# Patient Record
Sex: FEMALE | Race: WHITE | Employment: FULL TIME | URBAN - METROPOLITAN AREA
[De-identification: names, ages, dates, MRNs, and addresses within clinical notes are randomized per-mention and may not be internally consistent; named-entity substitution may affect disease eponyms.]

---

## 2023-03-07 RX ORDER — PRAVASTATIN SODIUM 20 MG
20 TABLET ORAL NIGHTLY
COMMUNITY

## 2023-03-07 RX ORDER — CALCIUM CARBONATE 500(1250)
500 TABLET ORAL NIGHTLY
COMMUNITY

## 2023-03-07 RX ORDER — METOPROLOL TARTRATE 100 MG/1
100 TABLET ORAL 2 TIMES DAILY
COMMUNITY

## 2023-03-07 RX ORDER — LOSARTAN POTASSIUM 50 MG/1
50 TABLET ORAL NIGHTLY
COMMUNITY

## 2023-03-07 RX ORDER — LEVOTHYROXINE SODIUM 0.1 MG/1
100 TABLET ORAL DAILY
COMMUNITY

## 2023-03-07 NOTE — PROGRESS NOTES
Patient verified name and     Order for consent WAS NOT found in EHR and matches case posting; patient verified. Type 1A surgery, PHONE assessment complete. Labs per surgeon: PENDING ORDERS  Labs per anesthesia protocol: NONE  EKG: IN CC AS NEEDED-- [address] Pérez approved surgical skin cleanser and instructions given per hospital policy. Patient provided with and instructed on educational handouts including Guide to Surgery, Pain Management, Hand Hygiene, Blood Transfusion Education, and [facility] Brochure. Patient answered medical/surgical history questions at their best of ability. All prior to admission medications documented in [facility]. Original medication prescription bottle WAS NOT visualized during patient appointment. Patient instructed to hold all vitamins 7 days prior to surgery and NSAIDS 5 days prior to surgery, patient verbalized understanding. Patient teach back successful and patient demonstrates knowledge of instructions.

## 2023-03-07 NOTE — PROGRESS NOTES
PLEASE CONTINUE TAKING ALL PRESCRIPTION MEDICATIONS UP TO THE DAY OF SURGERY UNLESS OTHERWISE DIRECTED BELOW. DISCONTINUE all vitamins and supplements 7 days prior to surgery. DISCONTINUE Non-Steriodal Anti-Inflammatory (NSAIDS) such as Advil and Aleve 5 days prior to surgery. Home Medications to take  the day of surgery    Synthroid, metoprolol           Home Medications   to Hold   All vitamins and supplements        Comments      On the day before surgery please take Acetaminophen 1000mg in the morning and then again before bed. You may substitute for Tylenol 650 mg. Please do not bring home medications with you on the day of surgery unless otherwise directed by your nurse. If you are instructed to bring home medications, please give them to your nurse as they will be administered by the nursing staff. If you have any questions, [facility] [phone] or [facility] [phone]. A copy of this note was provided to the patient for reference.

## 2023-03-16 ENCOUNTER — ANESTHESIA EVENT (OUTPATIENT)
Dept: SURGERY | Age: 68
End: 2023-03-16
Payer: MEDICARE

## 2023-03-16 NOTE — PERIOP NOTE
Preop department called to notify patient of arrival time for scheduled procedure. Instructions given to   - Arrive at [address] - Remain NPO after midnight, unless otherwise indicated, including gum, mints, and ice chips. - Have a responsible adult to drive patient to the hospital, stay during surgery, and patient will need supervision 24 hours after anesthesia. - Use antibacterial soap in shower the night before surgery and on the morning of surgery.        Was patient contacted: no  Voicemail left: yes  Numbers contacted: [phone]   Arrival time: 3128    Pt mobile had static when answered , left detailed voicemail on [phone]

## 2023-03-17 ENCOUNTER — ANESTHESIA (OUTPATIENT)
Dept: SURGERY | Age: 68
End: 2023-03-17
Payer: MEDICARE

## 2023-03-17 ENCOUNTER — HOSPITAL ENCOUNTER (OUTPATIENT)
Age: 68
Setting detail: OUTPATIENT SURGERY
Discharge: HOME | End: 2023-03-17
Attending: PODIATRIST | Admitting: PODIATRIST
Payer: MEDICARE

## 2023-03-17 ENCOUNTER — APPOINTMENT (OUTPATIENT)
Dept: GENERAL RADIOLOGY | Age: 68
End: 2023-03-17
Attending: PODIATRIST
Payer: MEDICARE

## 2023-03-17 VITALS
RESPIRATION RATE: 16 BRPM | BODY MASS INDEX: 25.71 KG/M2 | TEMPERATURE: 97 F | HEART RATE: 59 BPM | OXYGEN SATURATION: 98 % | DIASTOLIC BLOOD PRESSURE: 59 MMHG | WEIGHT: 160 LBS | HEIGHT: 66 IN | SYSTOLIC BLOOD PRESSURE: 130 MMHG

## 2023-03-17 DIAGNOSIS — Z98.890 S/P FOOT SURGERY: Primary | ICD-10-CM

## 2023-03-17 LAB
GLUCOSE BLD STRIP.AUTO-MCNC: 90 MG/DL (ref 65–100)
SERVICE CMNT-IMP: NORMAL

## 2023-03-17 PROCEDURE — 7100000000 HC PACU RECOVERY - FIRST 15 MIN: Performed by: PODIATRIST

## 2023-03-17 PROCEDURE — 2709999900 HC NON-CHARGEABLE SUPPLY: Performed by: PODIATRIST

## 2023-03-17 PROCEDURE — 7100000011 HC PHASE II RECOVERY - ADDTL 15 MIN: Performed by: PODIATRIST

## 2023-03-17 PROCEDURE — 7100000001 HC PACU RECOVERY - ADDTL 15 MIN: Performed by: PODIATRIST

## 2023-03-17 PROCEDURE — 2500000003 HC RX 250 WO HCPCS: Performed by: NURSE ANESTHETIST, CERTIFIED REGISTERED

## 2023-03-17 PROCEDURE — 3600000012 HC SURGERY LEVEL 2 ADDTL 15MIN: Performed by: PODIATRIST

## 2023-03-17 PROCEDURE — 6360000002 HC RX W HCPCS: Performed by: NURSE ANESTHETIST, CERTIFIED REGISTERED

## 2023-03-17 PROCEDURE — 3600000002 HC SURGERY LEVEL 2 BASE: Performed by: PODIATRIST

## 2023-03-17 PROCEDURE — 7100000010 HC PHASE II RECOVERY - FIRST 15 MIN: Performed by: PODIATRIST

## 2023-03-17 PROCEDURE — 6360000002 HC RX W HCPCS: Performed by: PODIATRIST

## 2023-03-17 PROCEDURE — 3700000001 HC ADD 15 MINUTES (ANESTHESIA): Performed by: PODIATRIST

## 2023-03-17 PROCEDURE — 82962 GLUCOSE BLOOD TEST: CPT

## 2023-03-17 PROCEDURE — 3700000000 HC ANESTHESIA ATTENDED CARE: Performed by: PODIATRIST

## 2023-03-17 PROCEDURE — 2500000003 HC RX 250 WO HCPCS: Performed by: PODIATRIST

## 2023-03-17 PROCEDURE — 2580000003 HC RX 258: Performed by: ANESTHESIOLOGY

## 2023-03-17 PROCEDURE — 6360000002 HC RX W HCPCS: Performed by: ANESTHESIOLOGY

## 2023-03-17 RX ORDER — HYDRALAZINE HYDROCHLORIDE 20 MG/ML
10 INJECTION INTRAMUSCULAR; INTRAVENOUS
Status: DISCONTINUED | OUTPATIENT
Start: 2023-03-17 | End: 2023-03-17 | Stop reason: HOSPADM

## 2023-03-17 RX ORDER — BUPIVACAINE HYDROCHLORIDE 5 MG/ML
INJECTION, SOLUTION EPIDURAL; INTRACAUDAL PRN
Status: DISCONTINUED | OUTPATIENT
Start: 2023-03-17 | End: 2023-03-17 | Stop reason: HOSPADM

## 2023-03-17 RX ORDER — SODIUM CHLORIDE, SODIUM LACTATE, POTASSIUM CHLORIDE, CALCIUM CHLORIDE 600; 310; 30; 20 MG/100ML; MG/100ML; MG/100ML; MG/100ML
INJECTION, SOLUTION INTRAVENOUS CONTINUOUS
Status: DISCONTINUED | OUTPATIENT
Start: 2023-03-17 | End: 2023-03-17 | Stop reason: HOSPADM

## 2023-03-17 RX ORDER — LIDOCAINE HYDROCHLORIDE 10 MG/ML
INJECTION, SOLUTION INFILTRATION; PERINEURAL PRN
Status: DISCONTINUED | OUTPATIENT
Start: 2023-03-17 | End: 2023-03-17 | Stop reason: HOSPADM

## 2023-03-17 RX ORDER — HYDROXYZINE PAMOATE 25 MG/1
25 CAPSULE ORAL EVERY 6 HOURS PRN
Qty: 20 CAPSULE | Refills: 0 | Status: SHIPPED | OUTPATIENT
Start: 2023-03-17 | End: 2023-03-31

## 2023-03-17 RX ORDER — OXYCODONE HYDROCHLORIDE 5 MG/1
5 TABLET ORAL
Status: DISCONTINUED | OUTPATIENT
Start: 2023-03-17 | End: 2023-03-17 | Stop reason: HOSPADM

## 2023-03-17 RX ORDER — CEPHALEXIN 500 MG/1
500 CAPSULE ORAL 2 TIMES DAILY
Qty: 14 CAPSULE | Refills: 0 | Status: SHIPPED | OUTPATIENT
Start: 2023-03-17 | End: 2023-03-24

## 2023-03-17 RX ORDER — HYDROMORPHONE HYDROCHLORIDE 2 MG/ML
0.25 INJECTION, SOLUTION INTRAMUSCULAR; INTRAVENOUS; SUBCUTANEOUS EVERY 5 MIN PRN
Status: DISCONTINUED | OUTPATIENT
Start: 2023-03-17 | End: 2023-03-17 | Stop reason: HOSPADM

## 2023-03-17 RX ORDER — LABETALOL HYDROCHLORIDE 5 MG/ML
10 INJECTION, SOLUTION INTRAVENOUS
Status: DISCONTINUED | OUTPATIENT
Start: 2023-03-17 | End: 2023-03-17 | Stop reason: HOSPADM

## 2023-03-17 RX ORDER — FENTANYL CITRATE 50 UG/ML
100 INJECTION, SOLUTION INTRAMUSCULAR; INTRAVENOUS
Status: DISCONTINUED | OUTPATIENT
Start: 2023-03-17 | End: 2023-03-17 | Stop reason: HOSPADM

## 2023-03-17 RX ORDER — SODIUM CHLORIDE 0.9 % (FLUSH) 0.9 %
5-40 SYRINGE (ML) INJECTION EVERY 12 HOURS SCHEDULED
Status: DISCONTINUED | OUTPATIENT
Start: 2023-03-17 | End: 2023-03-17 | Stop reason: HOSPADM

## 2023-03-17 RX ORDER — SODIUM CHLORIDE 9 MG/ML
INJECTION, SOLUTION INTRAVENOUS PRN
Status: DISCONTINUED | OUTPATIENT
Start: 2023-03-17 | End: 2023-03-17 | Stop reason: HOSPADM

## 2023-03-17 RX ORDER — EPHEDRINE SULFATE/0.9% NACL/PF 50 MG/5 ML
SYRINGE (ML) INTRAVENOUS PRN
Status: DISCONTINUED | OUTPATIENT
Start: 2023-03-17 | End: 2023-03-17 | Stop reason: SDUPTHER

## 2023-03-17 RX ORDER — PROPOFOL 10 MG/ML
INJECTION, EMULSION INTRAVENOUS PRN
Status: DISCONTINUED | OUTPATIENT
Start: 2023-03-17 | End: 2023-03-17 | Stop reason: SDUPTHER

## 2023-03-17 RX ORDER — ONDANSETRON 2 MG/ML
4 INJECTION INTRAMUSCULAR; INTRAVENOUS
Status: DISCONTINUED | OUTPATIENT
Start: 2023-03-17 | End: 2023-03-17 | Stop reason: HOSPADM

## 2023-03-17 RX ORDER — SODIUM CHLORIDE 0.9 % (FLUSH) 0.9 %
5-40 SYRINGE (ML) INJECTION PRN
Status: DISCONTINUED | OUTPATIENT
Start: 2023-03-17 | End: 2023-03-17 | Stop reason: HOSPADM

## 2023-03-17 RX ORDER — LIDOCAINE HYDROCHLORIDE 20 MG/ML
INJECTION, SOLUTION EPIDURAL; INFILTRATION; INTRACAUDAL; PERINEURAL PRN
Status: DISCONTINUED | OUTPATIENT
Start: 2023-03-17 | End: 2023-03-17 | Stop reason: SDUPTHER

## 2023-03-17 RX ORDER — HYDROMORPHONE HYDROCHLORIDE 2 MG/ML
0.5 INJECTION, SOLUTION INTRAMUSCULAR; INTRAVENOUS; SUBCUTANEOUS EVERY 5 MIN PRN
Status: DISCONTINUED | OUTPATIENT
Start: 2023-03-17 | End: 2023-03-17 | Stop reason: HOSPADM

## 2023-03-17 RX ORDER — LIDOCAINE HYDROCHLORIDE 10 MG/ML
1 INJECTION, SOLUTION INFILTRATION; PERINEURAL
Status: DISCONTINUED | OUTPATIENT
Start: 2023-03-17 | End: 2023-03-17 | Stop reason: HOSPADM

## 2023-03-17 RX ORDER — HYDROCODONE BITARTRATE AND ACETAMINOPHEN 7.5; 325 MG/1; MG/1
1 TABLET ORAL EVERY 6 HOURS PRN
Qty: 20 TABLET | Refills: 0 | Status: SHIPPED | OUTPATIENT
Start: 2023-03-17 | End: 2023-03-20

## 2023-03-17 RX ORDER — PROCHLORPERAZINE EDISYLATE 5 MG/ML
5 INJECTION INTRAMUSCULAR; INTRAVENOUS
Status: DISCONTINUED | OUTPATIENT
Start: 2023-03-17 | End: 2023-03-17 | Stop reason: HOSPADM

## 2023-03-17 RX ORDER — MIDAZOLAM HYDROCHLORIDE 2 MG/2ML
2 INJECTION, SOLUTION INTRAMUSCULAR; INTRAVENOUS
Status: DISCONTINUED | OUTPATIENT
Start: 2023-03-17 | End: 2023-03-17 | Stop reason: HOSPADM

## 2023-03-17 RX ADMIN — PROPOFOL 30 MG: 10 INJECTION, EMULSION INTRAVENOUS at 12:58

## 2023-03-17 RX ADMIN — PROPOFOL 20 MG: 10 INJECTION, EMULSION INTRAVENOUS at 13:26

## 2023-03-17 RX ADMIN — PROPOFOL 50 MG: 10 INJECTION, EMULSION INTRAVENOUS at 12:52

## 2023-03-17 RX ADMIN — SODIUM CHLORIDE, POTASSIUM CHLORIDE, SODIUM LACTATE AND CALCIUM CHLORIDE: 600; 310; 30; 20 INJECTION, SOLUTION INTRAVENOUS at 10:55

## 2023-03-17 RX ADMIN — Medication 15 MG: at 13:09

## 2023-03-17 RX ADMIN — HYDROMORPHONE HYDROCHLORIDE 0.5 MG: 2 INJECTION, SOLUTION INTRAMUSCULAR; INTRAVENOUS; SUBCUTANEOUS at 13:45

## 2023-03-17 RX ADMIN — Medication 2000 MG: at 12:42

## 2023-03-17 RX ADMIN — PROPOFOL 140 MCG/KG/MIN: 10 INJECTION, EMULSION INTRAVENOUS at 13:03

## 2023-03-17 RX ADMIN — LIDOCAINE HYDROCHLORIDE 30 MG: 20 INJECTION, SOLUTION EPIDURAL; INFILTRATION; INTRACAUDAL; PERINEURAL at 12:52

## 2023-03-17 RX ADMIN — PROPOFOL 20 MG: 10 INJECTION, EMULSION INTRAVENOUS at 13:06

## 2023-03-17 RX ADMIN — PROPOFOL 50 MG: 10 INJECTION, EMULSION INTRAVENOUS at 12:56

## 2023-03-17 NOTE — ANESTHESIA POSTPROCEDURE EVALUATION
Department of Anesthesiology  Postprocedure Note    Patient: Aide Reece  MRN: [id number]  YOB: 1955  Date of evaluation: 3/17/2023      Procedure Summary     Date: 03/17/23 Room / Location: [facility] OP OR 01 / [facility]    Anesthesia Start: 9432 Anesthesia Stop: 4450    Procedure: TOTAL RIGHT 3RD TOE AMPUTATION (Right: Third Toe) Diagnosis:       Ulcer of foot due to secondary diabetes mellitus ([address]      Acute osteomyelitis-ankle/foot, right ([address]      (Ulcer of foot due to secondary diabetes mellitus ([address] [G42.417, L97.509])      (Acute osteomyelitis-ankle/foot, right ([address] [A72.929])    Surgeons: Dulce Maria Ramsay MD Responsible Provider: Mika Bae DO    Anesthesia Type: TIVA ASA Status: 3          Anesthesia Type: No value filed.     Nagi Phase I: Nagi Score: 8    Nagi Phase II: Nagi Score: 10      Anesthesia Post Evaluation    Patient location during evaluation: PACU  Level of consciousness: awake and alert  Airway patency: patent  Nausea & Vomiting: no nausea  Complications: no  Cardiovascular status: hemodynamically stable  Respiratory status: acceptable  Hydration status: euvolemic

## 2023-03-17 NOTE — OP NOTE
[facility]  OPERATIVE REPORT    Name:  Tennille Vasques  MR#:  [id number]  :  1955  ACCOUNT #:  [de-identified]  DATE OF SERVICE:  2023    PREOPERATIVE DIAGNOSES:  1. Diabetic with foot ulcer. 2.  Osteomyelitis, right third toe. POSTOPERATIVE DIAGNOSES:  1. Diabetic with foot ulcer. 2.  Osteomyelitis, right third toe. PROCEDURE PERFORMED:  Right third toe amputation. SURGEON:  Dang Blair MD     ASSISTANT:  None. ANESTHESIA:  MAC with 6 mL of 1% lidocaine plain. COMPLICATIONS:  None. SPECIMENS REMOVED:  No specimen sent to Pathology. IMPLANTS:  None. ESTIMATED BLOOD LOSS:  Less than 5 mL. HEMOSTASIS:  Pneumatic ankle tourniquet at 250 mmHg on the right for 25 minutes. MATERIALS:  3-0 Vicryl, 3-0 nylon. INJECTABLES:  10 mL of 0.5% Marcaine plain. INDICATION FOR SURGERY:  The patient is a pleasant 66-year-old diabetic with chronic right third toe ulcer that probed bone. Patient's ulcer would not heel and the patient was complaining of significant pain. The wound did not respond to conservative care. Risks and benefits of amputation of the digit were reviewed in detail with the patient. All questions were answered. The patient presented to the operating room at [facility] for the procedure as an outpatient. PROCEDURE:  The patient brought to the operating room, placed on the operating table in supine position. After MAC anesthesia was obtained, the foot was scrubbed, prepped and draped in usual aseptic manner. 6 mL of 1% lidocaine plain were injected about the base of the right third toe. The foot was elevated for exsanguination. Pneumatic ankle tourniquet was inflated. Next, a linear longitudinal incision was made dorsal to the second metatarsophalangeal joint. A racket type incision was made around the right, is distal to the base of the proximal phalanx.   The incision was deepened down to bone with care taken to identify and retract all vital neurovascular structures. The distal toe was disarticulated at the proximal interphalangeal joint and the proximal phalanx was resected and disarticulated at the metatarsophalangeal joint. The wound was then flushed with copious amounts of sterile normal saline. The subcutaneous tissues were reapproximated and coapted utilizing 3-0 Vicryl and the skin was reapproximated and coapted utilizing 3-0 nylon in a simple interrupted horizontal suture technique. 10 mL of 0.5% Marcaine plain were injected about the incision sites. Xeroform, dry sterile dressing followed by an Ace wrap were applied. The patient tolerated procedure and anesthesia well. He was transferred from the operating room to PACU with vital signs stable and neurovascular status intact to the toes of the right foot. He will be discharged with both written and oral postoperative instructions.       Ceasar Mota MD      [id number]  D:  03/17/2023 13:56  T:  03/17/2023 15:40  JOB #:  [id number]

## 2023-03-17 NOTE — DISCHARGE INSTRUCTIONS
ACTIVITY  As tolerated and as directed by your doctor. Bathe or shower as directed by your doctor. DIET  Clear liquids until no nausea or vomiting; then light diet for the first day. Advance to regular diet on second day, unless your doctor orders otherwise. If nausea and vomiting continues, call your doctor. After general anesthesia or intravenous sedation, for 24 hours or while taking prescription Narcotics:  Limit your activities  A responsible adult needs to be with you for the next 24 hours  Do not drive and operate hazardous machinery  Do not make important personal or business decisions  Do not drink alcoholic beverages  If you have not urinated within 8 hours after discharge, and you are experiencing discomfort from urinary retention, please go to the nearest ED. If you have sleep apnea and have a CPAP machine, please use it for all naps and sleeping. Please use caution when taking narcotics and any of your home medications that may cause drowsiness. *  Please give a list of your current medications to your Primary Care Provider. *  Please update this list whenever your medications are discontinued, doses are      changed, or new medications (including over-the-counter products) are added. *  Please carry medication information at all times in case of emergency situations.

## 2023-03-17 NOTE — H&P
Department of Anesthesiology  History and Physical                                        Name:  Pily Calzada           Age:  79 y.o.  :  1955                                               MRN:  [id number]                                                                      Date:  3/17/2023              Surgeon: Zak Benavidez):  Ria Malave MD     Procedure: Procedure(s):  PARTIAL RIGHT 3RD TOE AMPUTATION VS TOTAL RIGHT 3RD TOE AMPUTATION     Medications prior to admission:   Home Medications           Prior to Admission medications    Medication Sig Start Date End Date Taking?  Authorizing Provider   levothyroxine (SYNTHROID) 100 MCG tablet Take 100 mcg by mouth Daily     Yes Historical Provider, MD   metoprolol (LOPRESSOR) 100 MG tablet Take 100 mg by mouth 2 times daily     Yes Historical Provider, MD   pravastatin (PRAVACHOL) 20 MG tablet Take 20 mg by mouth at bedtime     Yes Historical Provider, MD   losartan (COZAAR) 50 MG tablet Take 50 mg by mouth at bedtime     Yes Historical Provider, MD   Coenzyme Q10 (COQ-10) 100 MG CAPS Take 1 capsule by mouth daily     Yes Historical Provider, MD   calcium carbonate (OSCAL) 500 MG TABS tablet Take 500 mg by mouth at bedtime     Yes Historical Provider, MD   Glucosamine-Chondroit-Vit C-Mn (GLUCOSAMINE 1500 COMPLEX PO) Take 1 tablet by mouth daily     Yes Historical Provider, MD            Current medications:    Current Facility-Administered Medications             Current Facility-Administered Medications   Medication Dose Route Frequency Provider Last Rate Last Admin    lidocaine 1 % injection 1 mL  1 mL IntraDERmal Once PRN [facility] Noel Weir DO        fentaNYL (SUBLIMAZE) injection 100 mcg  100 mcg IntraVENous Once PRN [facility] aCstaneda, DO        lactated ringers IV soln infusion   IntraVENous Continuous [facility] Castaneda,  mL/hr at 23 1055 New Bag at 23 1055    sodium chloride flush 0.9 % injection 5-40 mL  5-40 mL IntraVENous 2 times per day Sue Michaels, DO        sodium chloride flush 0.9 % injection 5-40 mL  5-40 mL IntraVENous PRN Sue Michaels, DO        0.9 % sodium chloride infusion   IntraVENous PRN Sue Michaels, DO        midazolam PF (VERSED) injection 2 mg  2 mg IntraVENous Once PRN Sue Castaneda, DO        ceFAZolin (ANCEF) 2000 mg in sterile water 20 mL IV syringe  2,000 mg IntraVENous Once Andree Urbina MD                Allergies: Allergies   Allergen Reactions    Codeine Headaches         Problem List:  There is no problem list on file for this patient. Past Medical History:    Past Medical History             Diagnosis Date    Adverse effect of anesthesia       pt states with locals she doesnt ever get numb    Diet-controlled diabetes mellitus ([address]       sqbs am avg  115--125-- no hypo    [place] (hard of hearing)       wears hearing aids in both ears    Hypercholesterolemia      Hypertension       controlled with med    LBBB (left bundle branch block)       followed by dr Ventura Hernandez cardio    Rectal cancer [facility]) 2013     surg only    Thyroid disease       hypo            Past Surgical History:    Past Surgical History             Procedure Laterality Date    BREAST LUMPECTOMY Right       1986 and 1992-- benign    [place] Bilateral 2007     CATARACTS    HYSTERECTOMY (CERVIX STATUS UNKNOWN)   [address] Right 2020     [employer]      LAP. --- [facility]    RECTAL SURGERY   2013     CANCER REMOVED-- THEN REPAIR SAME YR    SINUS SURGERY   1984            Social History:    Social History           Tobacco Use    Smoking status: Never    Smokeless tobacco: Never   Substance Use Topics    Alcohol use: Never                                 Counseling given: Not Answered        Vital Signs (Current):   Vitals        Vitals:     03/07/23 1400 03/17/23 1035   BP:   (!) 154/69 Pulse:   51   Resp:   18   Temp:   98 °F (36.7 °C)   TempSrc:   Oral   SpO2:   99%   Weight: 160 lb (72.6 kg) 160 lb (72.6 kg)   Height: 5' 6\" (1.676 m)                                                       BP Readings from Last 3 Encounters:   03/17/23 (!) 154/69         NPO Status: Time of last liquid consumption: 2359                        Time of last solid consumption: 2359                        Date of last liquid consumption: 03/16/23                        Date of last solid food consumption: 03/16/23     BMI:       Wt Readings from Last 3 Encounters:   03/17/23 160 lb (72.6 kg)     Body mass index is 25.82 kg/m².      CBC: No results found for: WBC, RBC, HGB, HCT, MCV, RDW, PLT     CMP: No results found for: NA, K, CL, CO2, BUN, CREATININE, GFRAA, AGRATIO, LABGLOM, GLUCOSE, GLU, PROT, CALCIUM, BILITOT, ALKPHOS, AST, ALT     POC Tests:       Recent Labs     03/17/23  1057   POCGLU 90         Coags: No results found for: PROTIME, INR, APTT     HCG (If Applicable): No results found for: PREGTESTUR, PREGSERUM, HCG, HCGQUANT      ABGs: No results found for: PHART, PO2ART, APB6YZX, AZV1YFF, BEART, W1CJUHIA      Type & Screen (If Applicable):  No results found for: LABABO, LABRH     Drug/Infectious Status (If Applicable):  No results found for: HIV, HEPCAB     COVID-19 Screening (If Applicable): No results found for: COVID19           Anesthesia Evaluation  Patient summary reviewed  Airway: Mallampati: II             Dental: normal exam          Pulmonary: breath sounds clear to auscultation  (+) asthma:                                         Cardiovascular:  Exercise tolerance: good (>4 METS),   (+) hypertension:, hyperlipidemia           Rhythm: regular  Rate: normal                      ROS comment: Chronic LBBB      Neuro/Psych:   Negative Neuro/Psych ROS                GI/Hepatic/Renal:                Endo/Other:    (+) DiabetesType II DM, , hypothyroidism::., .                         Abdominal: Vascular: negative vascular ROS. Other Findings:             Anesthesia Plan        TIVA      ASA 3         Induction: intravenous. Anesthetic plan and risks discussed with patient and child/children.

## 2023-03-17 NOTE — ANESTHESIA PRE PROCEDURE
Department of Anesthesiology  Preprocedure Note       Name:  Michel Mckeon   Age:  79 y.o.  :  1955                                          MRN:  [id number]         Date:  3/17/2023      Surgeon: Michelle Knowles):  Danay Tom MD    Procedure: Procedure(s):  PARTIAL RIGHT 3RD TOE AMPUTATION VS TOTAL RIGHT 3RD TOE AMPUTATION    Medications prior to admission:   Prior to Admission medications    Medication Sig Start Date End Date Taking?  Authorizing Provider   levothyroxine (SYNTHROID) 100 MCG tablet Take 100 mcg by mouth Daily   Yes Historical Provider, MD   metoprolol (LOPRESSOR) 100 MG tablet Take 100 mg by mouth 2 times daily   Yes Historical Provider, MD   pravastatin (PRAVACHOL) 20 MG tablet Take 20 mg by mouth at bedtime   Yes Historical Provider, MD   losartan (COZAAR) 50 MG tablet Take 50 mg by mouth at bedtime   Yes Historical Provider, MD   Coenzyme Q10 (COQ-10) 100 MG CAPS Take 1 capsule by mouth daily   Yes Historical Provider, MD   calcium carbonate (OSCAL) 500 MG TABS tablet Take 500 mg by mouth at bedtime   Yes Historical Provider, MD   Glucosamine-Chondroit-Vit C-Mn (GLUCOSAMINE 1500 COMPLEX PO) Take 1 tablet by mouth daily   Yes Historical Provider, MD       Current medications:    Current Facility-Administered Medications   Medication Dose Route Frequency Provider Last Rate Last Admin    lidocaine 1 % injection 1 mL  1 mL IntraDERmal Once PRN Aviva Castaneda DO        fentaNYL (SUBLIMAZE) injection 100 mcg  100 mcg IntraVENous Once PRN Aviva Castaneda DO        lactated ringers IV soln infusion   IntraVENous Continuous Aviva Castaneda  mL/hr at 23 1055 New Bag at 23 1055    sodium chloride flush 0.9 % injection 5-40 mL  5-40 mL IntraVENous 2 times per day Aviva Castaneda DO        sodium chloride flush 0.9 % injection 5-40 mL  5-40 mL IntraVENous PRN Aviva Castaneda DO        0.9 % sodium chloride infusion   IntraVENous PRN Anil Meadows Ira Fernandez, DO        midazolam PF (VERSED) injection 2 mg  2 mg IntraVENous Once PRN Sonia Castaneda,         ceFAZolin (ANCEF) 2000 mg in sterile water 20 mL IV syringe  2,000 mg IntraVENous Once Candido Jolly MD           Allergies: Allergies   Allergen Reactions    Codeine Headaches       Problem List:  There is no problem list on file for this patient. Past Medical History:        Diagnosis Date    Adverse effect of anesthesia     pt states with locals she doesnt ever get numb    Diet-controlled diabetes mellitus ([address]     sqbs am avg  115--125-- no hypo    [place] (hard of hearing)     wears hearing aids in both ears    Hypercholesterolemia     Hypertension     controlled with med    LBBB (left bundle branch block)     followed by dr Darwin Singh cardio    [facility]) 2013    surg only    Thyroid disease     hypo       Past Surgical History:        Procedure Laterality Date    BREAST LUMPECTOMY Right     1986 and 1992-- benign   [place] Bilateral 2007    CATARACTS    HYSTERECTOMY (CERVIX STATUS UNKNOWN)  1987    [facility]   [address]    LAP[address] --- [address] RECTAL SURGERY  2013    CANCER REMOVED-- THEN REPAIR SAME YR    SINUS SURGERY  1984       Social History:    Social History     Tobacco Use    Smoking status: Never    Smokeless tobacco: Never   Substance Use Topics    Alcohol use: Never                                Counseling given: Not Answered      Vital Signs (Current):   Vitals:    03/07/23 1400 03/17/23 1035   BP:  (!) 154/69   Pulse:  51   Resp:  18   Temp:  98 °F (36.7 °C)   TempSrc:  Oral   SpO2:  99%   Weight: 160 lb (72.6 kg) 160 lb (72.6 kg)   Height: 5' 6\" (1.676 m)                                               BP Readings from Last 3 Encounters:   03/17/23 (!) 154/69       NPO Status: Time of last liquid consumption: 9776 Time of last solid consumption: 2359                        Date of last liquid consumption: 03/16/23                        Date of last solid food consumption: 03/16/23    BMI:   Wt Readings from Last 3 Encounters:   03/17/23 160 lb (72.6 kg)     Body mass index is 25.82 kg/m². CBC: No results found for: WBC, RBC, HGB, HCT, MCV, RDW, PLT    CMP: No results found for: NA, K, CL, CO2, BUN, CREATININE, GFRAA, AGRATIO, LABGLOM, GLUCOSE, GLU, PROT, CALCIUM, BILITOT, ALKPHOS, AST, ALT    POC Tests:   Recent Labs     03/17/23  1057   POCGLU 90       Coags: No results found for: PROTIME, INR, APTT    HCG (If Applicable): No results found for: PREGTESTUR, PREGSERUM, HCG, HCGQUANT     ABGs: No results found for: PHART, PO2ART, KPZ2BCP, NGA6RNF, BEART, S5TQXPAH     Type & Screen (If Applicable):  No results found for: LABABO, LABRH    Drug/Infectious Status (If Applicable):  No results found for: HIV, HEPCAB    COVID-19 Screening (If Applicable): No results found for: COVID19        Anesthesia Evaluation  Patient summary reviewed  Airway: Mallampati: II          Dental: normal exam         Pulmonary: breath sounds clear to auscultation  (+) asthma:                            Cardiovascular:  Exercise tolerance: good (>4 METS),   (+) hypertension:, hyperlipidemia        Rhythm: regular  Rate: normal                 ROS comment: Chronic LBBB     Neuro/Psych:   Negative Neuro/Psych ROS              GI/Hepatic/Renal:             Endo/Other:    (+) DiabetesType II DM, , hypothyroidism::., .                 Abdominal:             Vascular: negative vascular ROS. Other Findings:           Anesthesia Plan      TIVA     ASA 3       Induction: intravenous. Anesthetic plan and risks discussed with patient and child/children.                         Michael Caldera DO   3/17/2023

## 2025-07-16 ENCOUNTER — APPOINTMENT (OUTPATIENT)
Dept: URBAN - METROPOLITAN AREA CLINIC 23 | Facility: CLINIC | Age: 70
Setting detail: DERMATOLOGY
End: 2025-07-16

## 2025-07-16 DIAGNOSIS — D22 MELANOCYTIC NEVI: ICD-10-CM

## 2025-07-16 DIAGNOSIS — D18.0 HEMANGIOMA: ICD-10-CM

## 2025-07-16 DIAGNOSIS — L81.4 OTHER MELANIN HYPERPIGMENTATION: ICD-10-CM

## 2025-07-16 DIAGNOSIS — L82.1 OTHER SEBORRHEIC KERATOSIS: ICD-10-CM

## 2025-07-16 DIAGNOSIS — L57.8 OTHER SKIN CHANGES DUE TO CHRONIC EXPOSURE TO NONIONIZING RADIATION: ICD-10-CM

## 2025-07-16 DIAGNOSIS — L57.0 ACTINIC KERATOSIS: ICD-10-CM | Status: INADEQUATELY CONTROLLED

## 2025-07-16 DIAGNOSIS — Z71.89 OTHER SPECIFIED COUNSELING: ICD-10-CM

## 2025-07-16 PROBLEM — D22.72 MELANOCYTIC NEVI OF LEFT LOWER LIMB, INCLUDING HIP: Status: ACTIVE | Noted: 2025-07-16

## 2025-07-16 PROBLEM — D18.01 HEMANGIOMA OF SKIN AND SUBCUTANEOUS TISSUE: Status: ACTIVE | Noted: 2025-07-16

## 2025-07-16 PROBLEM — D22.5 MELANOCYTIC NEVI OF TRUNK: Status: ACTIVE | Noted: 2025-07-16

## 2025-07-16 PROCEDURE — ? PRESCRIPTION MEDICATION MANAGEMENT

## 2025-07-16 PROCEDURE — ? ADDITIONAL NOTES

## 2025-07-16 PROCEDURE — ? SUNSCREEN RECOMMENDATIONS

## 2025-07-16 PROCEDURE — ? PRESCRIPTION

## 2025-07-16 PROCEDURE — ? COUNSELING

## 2025-07-16 ASSESSMENT — LOCATION ZONE DERM
LOCATION ZONE: LEG
LOCATION ZONE: TRUNK
LOCATION ZONE: FACE
LOCATION ZONE: ARM

## 2025-07-16 ASSESSMENT — LOCATION DETAILED DESCRIPTION DERM
LOCATION DETAILED: LEFT PROXIMAL CALF
LOCATION DETAILED: RIGHT INFERIOR UPPER BACK
LOCATION DETAILED: RIGHT ANTERIOR SHOULDER
LOCATION DETAILED: LEFT ANTERIOR LATERAL PROXIMAL THIGH
LOCATION DETAILED: RIGHT DISTAL PRETIBIAL REGION
LOCATION DETAILED: MIDDLE STERNUM
LOCATION DETAILED: LEFT PROXIMAL DORSAL FOREARM
LOCATION DETAILED: LEFT SUPERIOR MEDIAL FOREHEAD
LOCATION DETAILED: RIGHT PROXIMAL DORSAL FOREARM
LOCATION DETAILED: LEFT SUPERIOR MEDIAL UPPER BACK
LOCATION DETAILED: LEFT RIB CAGE
LOCATION DETAILED: RIGHT DISTAL POSTERIOR THIGH
LOCATION DETAILED: LEFT ANTERIOR PROXIMAL UPPER ARM
LOCATION DETAILED: LEFT INFERIOR MEDIAL UPPER BACK
LOCATION DETAILED: RIGHT DISTAL DORSAL FOREARM
LOCATION DETAILED: RIGHT RIB CAGE
LOCATION DETAILED: RIGHT POSTERIOR SHOULDER
LOCATION DETAILED: LEFT POSTERIOR SHOULDER

## 2025-07-16 ASSESSMENT — LOCATION SIMPLE DESCRIPTION DERM
LOCATION SIMPLE: LEFT FOREARM
LOCATION SIMPLE: RIGHT SHOULDER
LOCATION SIMPLE: ABDOMEN
LOCATION SIMPLE: RIGHT FOREARM
LOCATION SIMPLE: RIGHT UPPER BACK
LOCATION SIMPLE: CHEST
LOCATION SIMPLE: LEFT SHOULDER
LOCATION SIMPLE: RIGHT POSTERIOR THIGH
LOCATION SIMPLE: RIGHT PRETIBIAL REGION
LOCATION SIMPLE: LEFT CALF
LOCATION SIMPLE: LEFT UPPER ARM
LOCATION SIMPLE: LEFT UPPER BACK
LOCATION SIMPLE: LEFT THIGH
LOCATION SIMPLE: LEFT FOREHEAD

## 2025-07-16 NOTE — PROCEDURE: PRESCRIPTION MEDICATION MANAGEMENT
Initiate Treatment: 5FU/Calcipotriene (Apply to treatment field bid x 7-10 days. Expect redness and irritation. Toxic to pets.)
Render In Strict Bullet Format?: No
Detail Level: Zone

## 2025-07-16 NOTE — HPI: EVALUATION OF SKIN LESION(S)
What Type Of Note Output Would You Prefer (Optional)?: Standard Output
Hpi Title: Evaluation of Skin Lesions
How Severe Are Your Spot(S)?: mild
Have Your Spot(S) Been Treated In The Past?: has not been treated
Additional History: Patient has a lesion of concern on her back as well as a rash area on her forehead which she has tried treating using me keto shampoo and an unspecified topical.

## (undated) DEVICE — DRAPE C ARM W54XL84IN MINI FOR OEC 6800

## (undated) DEVICE — BANDAGE,ELASTIC,ESMARK,STERILE,4"X9',LF: Brand: MEDLINE

## (undated) DEVICE — 1010 S-DRAPE TOWEL DRAPE 10/BX: Brand: STERI-DRAPE™

## (undated) DEVICE — SPONGE GZ W4XL4IN COT 12 PLY TYP VII WVN C FLD DSGN STERILE

## (undated) DEVICE — ZIMMER® STERILE DISPOSABLE TOURNIQUET CUFF WITH PLC, DUAL PORT, SINGLE BLADDER, 18 IN. (46 CM)

## (undated) DEVICE — SUTURE ETHLN SZ 3-0 L18IN NONABSORBABLE BLK PS-2 L19MM 3/8 1669H

## (undated) DEVICE — BANDAGE,GAUZE,CONFORMING,2"X75",STRL,LF: Brand: MEDLINE INDUSTRIES, INC.

## (undated) DEVICE — BNDG,ELSTC,MATRIX,STRL,4"X5YD,LF,HOOK&LP: Brand: MEDLINE

## (undated) DEVICE — INTENDED FOR TISSUE SEPARATION, AND OTHER PROCEDURES THAT REQUIRE A SHARP SURGICAL BLADE TO PUNCTURE OR CUT.: Brand: BARD-PARKER ® STAINLESS STEEL BLADES

## (undated) DEVICE — GLOVE SURG SZ 65 CRM LTX FREE POLYISOPRENE POLYMER BEAD ANTI

## (undated) DEVICE — STOCKINETTE,DOUBLE PLY,6X48,STERILE: Brand: MEDLINE

## (undated) DEVICE — PODIATRY: Brand: MEDLINE INDUSTRIES, INC.

## (undated) DEVICE — APPLICATOR MEDICATED 26 CC SOLUTION HI LT ORNG CHLORAPREP

## (undated) DEVICE — SOLUTION IRRIG 1000ML 0.9% SOD CHL USP POUR PLAS BTL

## (undated) DEVICE — DRESSING,GAUZE,XEROFORM,CURAD,1"X8",ST: Brand: CURAD